# Patient Record
Sex: FEMALE
[De-identification: names, ages, dates, MRNs, and addresses within clinical notes are randomized per-mention and may not be internally consistent; named-entity substitution may affect disease eponyms.]

---

## 2022-04-04 ENCOUNTER — APPOINTMENT (OUTPATIENT)
Dept: PAIN MANAGEMENT | Facility: CLINIC | Age: 72
End: 2022-04-04
Payer: MEDICARE

## 2022-04-04 VITALS — WEIGHT: 117 LBS | BODY MASS INDEX: 21.53 KG/M2 | HEIGHT: 62 IN

## 2022-04-04 PROBLEM — Z00.00 ENCOUNTER FOR PREVENTIVE HEALTH EXAMINATION: Status: ACTIVE | Noted: 2022-04-04

## 2022-04-04 PROCEDURE — 99213 OFFICE O/P EST LOW 20 MIN: CPT

## 2022-04-04 RX ORDER — CELECOXIB 200 MG/1
200 CAPSULE ORAL DAILY
Qty: 30 | Refills: 2 | Status: ACTIVE | COMMUNITY
Start: 2022-04-04 | End: 1900-01-01

## 2022-04-05 NOTE — ASSESSMENT
[FreeTextEntry1] : A thorough discussion occurred regarding available pain management treatment options including interventional, rehabilitative, pharmacological, and alternative modalities with the patient. We will proceed with the following:\par \par Interventional treatment options:\par - none indicated at present time \par - briefly discussed right cervical facet directed intervention with failure of conservative care \par - see additional instructions below\par \par Rehabilitative options:\par - continue physical therapy trial\par - transition to HEP as tolerated \par - see additional instructions below\par \par Medication based treatment options:\par - continue Celebrex 200mg daily PRN \par - see additional instructions below\par \par Complementary treatment options:\par - lifestyle modifications discussed\par - See additional instructions below\par \par Additional treatment recommendations as follows:\par - disposition: see below\par - follow up in 6 weeks or PRN \par \par Komal LABOY acting as scribe, attest that this documentation has been prepared under the direction and in the presence of Provider Kye Hale DO.\par \par The documentation recorded by the scribe, in my presence, accurately reflects the service I personally performed and the decisions made by me with my edits as appropriate.

## 2022-04-05 NOTE — HISTORY OF PRESENT ILLNESS
[Lower back] : lower back [4] : 4 [3] : 3 [Diffuse] : diffuse [Dull/Aching] : dull/aching [Intermittent] : intermittent [Meds] : meds [Physical therapy] : physical therapy [Walking] : walking [Bending forward] : bending forward [Stairs] : stairs [Retired] : Work status: retired [FreeTextEntry1] : 4/04/22 - Patient presents for a FUV. Patient reports she has utilized Celebrex and participated PT with benefit.  Patient reports she attended 6 sessions of PT since the previous visit. Patient reports she is at an acceptable level of pain. \par \par 2/28/22 - Patient presents for initial evaluation. She c/o neck pain into the right shoulder and further into bicep ending above the elbow. Symptoms began in December. Mild numbness and tingling of the hand which she attributes to recent CTR. Patient has tried conservative therapies such as Icy Hot, OTC medications. The neck and arm symptoms occur independently of one another. History of right shoulder surgery and carpal tunnel surgery. History of osteoarthritis. \par \par Previous Injections: No \par \par Pertinent Surgical History: N/A\par \par Imaging:\par MRI Cervical Spine (12/12/2018) - [facility]\par \par C2-3: There is no disc herniation, significant central canal or neural foraminal stenosis.\par C3-4: There is a mild to moderate ventral and bilateral disc/ridge complex with a larger left-sided component causing significant left foraminal compromise and suspected left C4\par nerve root impingement. There is no cord compression or significant central canal stenosis.\par C4-5: There is mild disc bulge and minimal bilateral uncovertebral joint degenerative change. There is no cord compression, significant central canal or foraminal stenosis.\par C5-6: There is mild to moderate ventral and bilateral disc/ridge complex with flattening\par of ventral thecal sac but no cord compression or significant central canal stenosis. There is mild bilateral foraminal encroachment.\par C6-7: There is mild disc bulge impinging upon the thecal sac. There is no cord compression, significant central canal or foraminal stenosis.\par C7-T1: There is a small right paracentral disc herniation impinging upon the thecal sac. There is no significant central canal or foraminal stenosis. The cord is normal in caliber and signal characteristics. The craniocervical junction is normal.\par Physician Disclaimer: I have personally reviewed and confirmed all HPI data with the patient. [] : no

## 2022-12-07 ENCOUNTER — OFFICE (OUTPATIENT)
Dept: URBAN - METROPOLITAN AREA CLINIC 6 | Facility: CLINIC | Age: 72
Setting detail: OPHTHALMOLOGY
End: 2022-12-07
Payer: MEDICARE

## 2022-12-07 DIAGNOSIS — H04.221: ICD-10-CM

## 2022-12-07 DIAGNOSIS — H43.393: ICD-10-CM

## 2022-12-07 DIAGNOSIS — H18.599: ICD-10-CM

## 2022-12-07 DIAGNOSIS — H16.223: ICD-10-CM

## 2022-12-07 DIAGNOSIS — H02.012: ICD-10-CM

## 2022-12-07 DIAGNOSIS — H25.13: ICD-10-CM

## 2022-12-07 PROCEDURE — 92014 COMPRE OPH EXAM EST PT 1/>: CPT | Performed by: OPHTHALMOLOGY

## 2022-12-07 ASSESSMENT — TONOMETRY
OS_IOP_MMHG: 20
OD_IOP_MMHG: 20

## 2022-12-07 ASSESSMENT — KERATOMETRY
OS_AXISANGLE_DEGREES: 005
OD_AXISANGLE_DEGREES: 090
OD_K2POWER_DIOPTERS: 45.50
OD_K1POWER_DIOPTERS: 45.50
OS_K1POWER_DIOPTERS: 45.25
OS_K2POWER_DIOPTERS: 46.00
METHOD_AUTO_MANUAL: AUTO

## 2022-12-07 ASSESSMENT — REFRACTION_CURRENTRX
OD_OVR_VA: 20/
OD_VPRISM_DIRECTION: SV
OS_ADD: +2.50
OS_OVR_VA: 20/
OD_ADD: +2.50
OS_VPRISM_DIRECTION: SV

## 2022-12-07 ASSESSMENT — REFRACTION_MANIFEST
OS_VA1: 20/30-1
OS_SPHERE: +0.25
OS_CYLINDER: -0.75
OD_VA1: 20/30-2
OS_AXIS: 088
OD_CYLINDER: -0.25
OD_AXIS: 104
OD_SPHERE: PLANO

## 2022-12-07 ASSESSMENT — CONFRONTATIONAL VISUAL FIELD TEST (CVF)
OD_FINDINGS: FULL
OS_FINDINGS: FULL

## 2022-12-07 ASSESSMENT — REFRACTION_AUTOREFRACTION
OS_AXIS: 088
OS_SPHERE: +0.25
OS_CYLINDER: -0.75
OD_SPHERE: PLANO
OD_AXIS: 104
OD_CYLINDER: -0.25

## 2022-12-07 ASSESSMENT — AXIALLENGTH_DERIVED
OS_AL: 22.8821
OS_AL: 22.8821

## 2022-12-07 ASSESSMENT — VISUAL ACUITY
OD_BCVA: 20/50-2
OS_BCVA: 20/30-2

## 2022-12-07 ASSESSMENT — LID EXAM ASSESSMENTS: OD_TRICHIASIS: RLL

## 2022-12-07 ASSESSMENT — DECREASING TEAR LAKE - SEVERITY SCORE
OS_DEC_TEARLAKE: 1+
OD_DEC_TEARLAKE: 1+

## 2022-12-07 ASSESSMENT — SPHEQUIV_DERIVED
OS_SPHEQUIV: -0.125
OS_SPHEQUIV: -0.125

## 2022-12-07 ASSESSMENT — CORNEAL DYSTROPHY
OS_DYSTROPHY: SUPERIORLY
OD_DYSTROPHY: SUPERIORLY

## 2023-01-22 ENCOUNTER — OFFICE (OUTPATIENT)
Dept: URBAN - METROPOLITAN AREA CLINIC 100 | Facility: CLINIC | Age: 73
Setting detail: OPHTHALMOLOGY
End: 2023-01-22
Payer: MEDICARE

## 2023-01-22 ENCOUNTER — RX ONLY (RX ONLY)
Age: 73
End: 2023-01-22

## 2023-01-22 DIAGNOSIS — H04.223: ICD-10-CM

## 2023-01-22 DIAGNOSIS — H04.551: ICD-10-CM

## 2023-01-22 PROCEDURE — 68840 EXPLORE/IRRIGATE TEAR DUCTS: CPT | Performed by: OPHTHALMOLOGY

## 2023-01-22 PROCEDURE — 31231 NASAL ENDOSCOPY DX: CPT | Performed by: OPHTHALMOLOGY

## 2023-01-22 ASSESSMENT — KERATOMETRY
OS_K1POWER_DIOPTERS: 45.25
OD_K2POWER_DIOPTERS: 45.50
OD_AXISANGLE_DEGREES: 090
OS_K2POWER_DIOPTERS: 46.00
OS_AXISANGLE_DEGREES: 005
METHOD_AUTO_MANUAL: AUTO

## 2023-01-22 ASSESSMENT — REFRACTION_AUTOREFRACTION
OS_SPHERE: +0.25
OD_SPHERE: PLANO
OS_CYLINDER: -0.75
OD_CYLINDER: -0.25
OD_AXIS: 104
OS_AXIS: 088

## 2023-01-22 ASSESSMENT — VISUAL ACUITY
OS_BCVA: 20/30
OD_BCVA: 20/50

## 2023-01-22 ASSESSMENT — REFRACTION_CURRENTRX
OS_ADD: +2.50
OD_OVR_VA: 20/
OD_ADD: +2.50
OS_VPRISM_DIRECTION: SV
OD_VPRISM_DIRECTION: SV
OS_OVR_VA: 20/

## 2023-01-22 ASSESSMENT — CONFRONTATIONAL VISUAL FIELD TEST (CVF)
OD_FINDINGS: FULL
OS_FINDINGS: FULL

## 2023-01-22 ASSESSMENT — LID EXAM ASSESSMENTS: OD_TRICHIASIS: RLL

## 2023-01-22 ASSESSMENT — AXIALLENGTH_DERIVED: OS_AL: 22.8821

## 2023-01-22 ASSESSMENT — CORNEAL DYSTROPHY
OS_DYSTROPHY: SUPERIORLY
OD_DYSTROPHY: SUPERIORLY

## 2023-01-22 ASSESSMENT — LACRIMAL DUCT - ASSESSMENT: OS_LACRIMAL_DUCT: PASSAGEWAY OPEN UPON IRRIGATION.

## 2023-01-22 ASSESSMENT — SPHEQUIV_DERIVED: OS_SPHEQUIV: -0.125

## 2023-01-22 ASSESSMENT — DECREASING TEAR LAKE - SEVERITY SCORE
OS_DEC_TEARLAKE: 1+
OD_DEC_TEARLAKE: 1+

## 2023-02-20 ENCOUNTER — OFFICE (OUTPATIENT)
Dept: URBAN - METROPOLITAN AREA CLINIC 6 | Facility: CLINIC | Age: 73
Setting detail: OPHTHALMOLOGY
End: 2023-02-20
Payer: MEDICARE

## 2023-02-20 DIAGNOSIS — H16.223: ICD-10-CM

## 2023-02-20 DIAGNOSIS — H04.223: ICD-10-CM

## 2023-02-20 DIAGNOSIS — H04.551: ICD-10-CM

## 2023-02-20 PROCEDURE — 99213 OFFICE O/P EST LOW 20 MIN: CPT | Performed by: OPHTHALMOLOGY

## 2023-02-20 ASSESSMENT — CORNEAL DYSTROPHY
OS_DYSTROPHY: SUPERIORLY
OD_DYSTROPHY: SUPERIORLY

## 2023-02-20 ASSESSMENT — DECREASING TEAR LAKE - SEVERITY SCORE: OS_DEC_TEARLAKE: 1+

## 2023-02-20 ASSESSMENT — CONFRONTATIONAL VISUAL FIELD TEST (CVF)
OS_FINDINGS: FULL
OD_FINDINGS: FULL

## 2023-02-20 ASSESSMENT — INCREASING TEAR LAKE - SEVERITY SCORE: OD_INC_TEARLAKE: 1+

## 2023-02-20 ASSESSMENT — LID EXAM ASSESSMENTS: OD_TRICHIASIS: ABSENT

## 2023-02-21 ASSESSMENT — AXIALLENGTH_DERIVED: OS_AL: 22.8821

## 2023-02-21 ASSESSMENT — KERATOMETRY
OD_K2POWER_DIOPTERS: 45.50
METHOD_AUTO_MANUAL: AUTO
OS_AXISANGLE_DEGREES: 005
OS_K2POWER_DIOPTERS: 46.00
OD_AXISANGLE_DEGREES: 090
OS_K1POWER_DIOPTERS: 45.25

## 2023-02-21 ASSESSMENT — REFRACTION_AUTOREFRACTION
OD_SPHERE: PLANO
OS_SPHERE: +0.25
OD_CYLINDER: -0.25
OD_AXIS: 104
OS_AXIS: 088
OS_CYLINDER: -0.75

## 2023-02-21 ASSESSMENT — VISUAL ACUITY
OD_BCVA: 20/50
OS_BCVA: 20/40-1

## 2023-02-21 ASSESSMENT — SPHEQUIV_DERIVED: OS_SPHEQUIV: -0.125

## 2023-03-09 ENCOUNTER — AMBULATORY SURGERY CENTER (OUTPATIENT)
Dept: URBAN - METROPOLITAN AREA SURGERY 4 | Facility: SURGERY | Age: 73
Setting detail: OPHTHALMOLOGY
End: 2023-03-09
Payer: MEDICARE

## 2023-03-09 DIAGNOSIS — H04.551: ICD-10-CM

## 2023-03-09 DIAGNOSIS — H04.223: ICD-10-CM

## 2023-03-09 PROCEDURE — 31200 REMOVAL OF ETHMOID SINUS: CPT | Performed by: OPHTHALMOLOGY

## 2023-03-09 PROCEDURE — 68720 CREATE TEAR SAC DRAIN: CPT | Performed by: OPHTHALMOLOGY

## 2023-03-09 PROCEDURE — 68815 PROBE NASOLACRIMAL DUCT: CPT | Performed by: OPHTHALMOLOGY

## 2023-03-20 ENCOUNTER — OFFICE (OUTPATIENT)
Dept: URBAN - METROPOLITAN AREA CLINIC 6 | Facility: CLINIC | Age: 73
Setting detail: OPHTHALMOLOGY
End: 2023-03-20
Payer: MEDICARE

## 2023-03-20 DIAGNOSIS — H16.223: ICD-10-CM

## 2023-03-20 DIAGNOSIS — H04.551: ICD-10-CM

## 2023-03-20 PROCEDURE — 99024 POSTOP FOLLOW-UP VISIT: CPT | Performed by: OPHTHALMOLOGY

## 2023-03-20 ASSESSMENT — SPHEQUIV_DERIVED: OS_SPHEQUIV: -0.125

## 2023-03-20 ASSESSMENT — CORNEAL DYSTROPHY
OS_DYSTROPHY: SUPERIORLY
OD_DYSTROPHY: SUPERIORLY

## 2023-03-20 ASSESSMENT — KERATOMETRY
OS_K1POWER_DIOPTERS: 45.25
OS_K2POWER_DIOPTERS: 46.00
OD_K2POWER_DIOPTERS: 45.50
OD_AXISANGLE_DEGREES: 090
OS_AXISANGLE_DEGREES: 005
METHOD_AUTO_MANUAL: AUTO

## 2023-03-20 ASSESSMENT — REFRACTION_AUTOREFRACTION
OS_SPHERE: +0.25
OD_SPHERE: PLANO
OD_CYLINDER: -0.25
OS_CYLINDER: -0.75
OD_AXIS: 104
OS_AXIS: 088

## 2023-03-20 ASSESSMENT — INCREASING TEAR LAKE - SEVERITY SCORE: OD_INC_TEARLAKE: 1+

## 2023-03-20 ASSESSMENT — LID EXAM ASSESSMENTS: OD_TRICHIASIS: ABSENT

## 2023-03-20 ASSESSMENT — AXIALLENGTH_DERIVED: OS_AL: 22.8821

## 2023-03-20 ASSESSMENT — CONFRONTATIONAL VISUAL FIELD TEST (CVF)
OS_FINDINGS: FULL
OD_FINDINGS: FULL

## 2023-03-20 ASSESSMENT — DECREASING TEAR LAKE - SEVERITY SCORE: OS_DEC_TEARLAKE: 1+

## 2023-03-20 ASSESSMENT — VISUAL ACUITY
OD_BCVA: 20/30-2
OS_BCVA: 20/30-1

## 2023-03-20 ASSESSMENT — LACRIMAL DUCT - ASSESSMENT: OD_LACRIMAL_DUCT: WOUND C/D/I HEALING WELL

## 2023-06-19 ENCOUNTER — OFFICE (OUTPATIENT)
Dept: URBAN - METROPOLITAN AREA CLINIC 6 | Facility: CLINIC | Age: 73
Setting detail: OPHTHALMOLOGY
End: 2023-06-19
Payer: MEDICARE

## 2023-06-19 DIAGNOSIS — H04.551: ICD-10-CM

## 2023-06-19 DIAGNOSIS — H16.223: ICD-10-CM

## 2023-06-19 PROCEDURE — 99213 OFFICE O/P EST LOW 20 MIN: CPT | Performed by: OPHTHALMOLOGY

## 2023-06-19 ASSESSMENT — REFRACTION_AUTOREFRACTION
OS_AXIS: 088
OD_SPHERE: PLANO
OD_AXIS: 104
OS_CYLINDER: -0.75
OS_SPHERE: +0.25
OD_CYLINDER: -0.25

## 2023-06-19 ASSESSMENT — LID EXAM ASSESSMENTS: OD_TRICHIASIS: ABSENT

## 2023-06-19 ASSESSMENT — KERATOMETRY
OD_AXISANGLE_DEGREES: 090
OS_K2POWER_DIOPTERS: 46.00
OS_K1POWER_DIOPTERS: 45.25
OD_K2POWER_DIOPTERS: 45.50
OS_AXISANGLE_DEGREES: 005
METHOD_AUTO_MANUAL: AUTO

## 2023-06-19 ASSESSMENT — SPHEQUIV_DERIVED: OS_SPHEQUIV: -0.125

## 2023-06-19 ASSESSMENT — VISUAL ACUITY
OS_BCVA: 20/50
OD_BCVA: 20/30-2

## 2023-06-19 ASSESSMENT — CONFRONTATIONAL VISUAL FIELD TEST (CVF)
OD_FINDINGS: FULL
OS_FINDINGS: FULL

## 2023-06-19 ASSESSMENT — CORNEAL DYSTROPHY
OD_DYSTROPHY: SUPERIORLY
OS_DYSTROPHY: SUPERIORLY

## 2023-06-19 ASSESSMENT — INCREASING TEAR LAKE - SEVERITY SCORE: OD_INC_TEARLAKE: 1+

## 2023-06-19 ASSESSMENT — DECREASING TEAR LAKE - SEVERITY SCORE: OS_DEC_TEARLAKE: 1+

## 2023-06-19 ASSESSMENT — AXIALLENGTH_DERIVED: OS_AL: 22.8821

## 2023-08-23 ENCOUNTER — RX ONLY (RX ONLY)
Age: 73
End: 2023-08-23

## 2023-08-23 ENCOUNTER — OFFICE (OUTPATIENT)
Dept: URBAN - METROPOLITAN AREA CLINIC 104 | Facility: CLINIC | Age: 73
Setting detail: OPHTHALMOLOGY
End: 2023-08-23
Payer: MEDICARE

## 2023-08-23 DIAGNOSIS — H04.551: ICD-10-CM

## 2023-08-23 DIAGNOSIS — H04.223: ICD-10-CM

## 2023-08-23 PROBLEM — H10.41 ALLERGIC CONJUNCTIVITIS: Status: ACTIVE | Noted: 2023-08-23

## 2023-08-23 PROCEDURE — 68840 EXPLORE/IRRIGATE TEAR DUCTS: CPT | Performed by: OPHTHALMOLOGY

## 2023-08-23 PROCEDURE — 99213 OFFICE O/P EST LOW 20 MIN: CPT | Performed by: OPHTHALMOLOGY

## 2023-08-23 ASSESSMENT — CONFRONTATIONAL VISUAL FIELD TEST (CVF)
OS_FINDINGS: FULL
OD_FINDINGS: FULL

## 2023-08-23 ASSESSMENT — KERATOMETRY
OD_K2POWER_DIOPTERS: 45.50
OD_AXISANGLE_DEGREES: 090
METHOD_AUTO_MANUAL: AUTO
OS_AXISANGLE_DEGREES: 005
OS_K1POWER_DIOPTERS: 45.25
OS_K2POWER_DIOPTERS: 46.00

## 2023-08-23 ASSESSMENT — LACRIMAL DUCT - ASSESSMENT: OD_LACRIMAL_DUCT: PASSAGEWAY OPEN UPON IRRIGATION

## 2023-08-23 ASSESSMENT — CORNEAL DYSTROPHY
OD_DYSTROPHY: SUPERIORLY
OS_DYSTROPHY: SUPERIORLY

## 2023-08-23 ASSESSMENT — VISUAL ACUITY
OD_BCVA: 20/40-2
OS_BCVA: 20/40

## 2023-08-23 ASSESSMENT — INCREASING TEAR LAKE - SEVERITY SCORE: OD_INC_TEARLAKE: 1+

## 2023-08-23 ASSESSMENT — REFRACTION_AUTOREFRACTION
OS_SPHERE: +0.25
OD_CYLINDER: -0.25
OS_AXIS: 088
OS_CYLINDER: -0.75
OD_AXIS: 104
OD_SPHERE: PLANO

## 2023-08-23 ASSESSMENT — SPHEQUIV_DERIVED: OS_SPHEQUIV: -0.125

## 2023-08-23 ASSESSMENT — LID EXAM ASSESSMENTS: OD_TRICHIASIS: ABSENT

## 2023-08-23 ASSESSMENT — AXIALLENGTH_DERIVED: OS_AL: 22.8821

## 2023-08-23 ASSESSMENT — DECREASING TEAR LAKE - SEVERITY SCORE: OS_DEC_TEARLAKE: 1+

## 2023-10-16 ENCOUNTER — OFFICE (OUTPATIENT)
Dept: URBAN - METROPOLITAN AREA CLINIC 6 | Facility: CLINIC | Age: 73
Setting detail: OPHTHALMOLOGY
End: 2023-10-16
Payer: MEDICARE

## 2023-10-16 DIAGNOSIS — H02.132: ICD-10-CM

## 2023-10-16 DIAGNOSIS — H04.223: ICD-10-CM

## 2023-10-16 DIAGNOSIS — H02.135: ICD-10-CM

## 2023-10-16 PROBLEM — H10.45 ALLERGIC CONJUNCTIVITIS ; BOTH EYES: Status: ACTIVE | Noted: 2023-10-16

## 2023-10-16 PROCEDURE — 92285 EXTERNAL OCULAR PHOTOGRAPHY: CPT | Performed by: OPHTHALMOLOGY

## 2023-10-16 PROCEDURE — 68840 EXPLORE/IRRIGATE TEAR DUCTS: CPT | Mod: 50 | Performed by: OPHTHALMOLOGY

## 2023-10-16 PROCEDURE — 99214 OFFICE O/P EST MOD 30 MIN: CPT | Mod: 25 | Performed by: OPHTHALMOLOGY

## 2023-10-16 ASSESSMENT — AXIALLENGTH_DERIVED: OS_AL: 22.8821

## 2023-10-16 ASSESSMENT — REFRACTION_AUTOREFRACTION
OS_SPHERE: +0.25
OD_AXIS: 104
OD_CYLINDER: -0.25
OS_CYLINDER: -0.75
OS_AXIS: 088
OD_SPHERE: PLANO

## 2023-10-16 ASSESSMENT — KERATOMETRY
OD_K2POWER_DIOPTERS: 45.50
METHOD_AUTO_MANUAL: AUTO
OD_AXISANGLE_DEGREES: 090
OS_K2POWER_DIOPTERS: 46.00
OS_K1POWER_DIOPTERS: 45.25
OS_AXISANGLE_DEGREES: 005

## 2023-10-16 ASSESSMENT — LID POSITION - ECTROPION
OS_ECTROPION: LLL 2+
OD_ECTROPION: RLL 2+

## 2023-10-16 ASSESSMENT — SPHEQUIV_DERIVED: OS_SPHEQUIV: -0.125

## 2023-10-16 ASSESSMENT — CONFRONTATIONAL VISUAL FIELD TEST (CVF)
OS_FINDINGS: FULL
OD_FINDINGS: FULL

## 2023-10-16 ASSESSMENT — VISUAL ACUITY
OS_BCVA: 20/50+1
OD_BCVA: 20/40-2

## 2023-10-16 ASSESSMENT — INCREASING TEAR LAKE - SEVERITY SCORE: OD_INC_TEARLAKE: 1+

## 2023-10-16 ASSESSMENT — DECREASING TEAR LAKE - SEVERITY SCORE: OS_DEC_TEARLAKE: 1+

## 2023-10-16 ASSESSMENT — LACRIMAL DUCT - ASSESSMENT: OD_LACRIMAL_DUCT: PASSAGEWAY OPEN UPON IRRIGATION

## 2023-10-16 ASSESSMENT — LID EXAM ASSESSMENTS: OD_TRICHIASIS: ABSENT

## 2023-10-16 ASSESSMENT — CORNEAL DYSTROPHY
OS_DYSTROPHY: SUPERIORLY
OD_DYSTROPHY: SUPERIORLY

## 2023-12-08 ENCOUNTER — APPOINTMENT (OUTPATIENT)
Dept: ORTHOPEDIC SURGERY | Facility: CLINIC | Age: 73
End: 2023-12-08

## 2024-02-09 ENCOUNTER — APPOINTMENT (OUTPATIENT)
Dept: ORTHOPEDIC SURGERY | Facility: CLINIC | Age: 74
End: 2024-02-09
Payer: MEDICARE

## 2024-02-09 PROCEDURE — 72040 X-RAY EXAM NECK SPINE 2-3 VW: CPT

## 2024-02-09 PROCEDURE — 73030 X-RAY EXAM OF SHOULDER: CPT | Mod: LT

## 2024-02-09 PROCEDURE — 99204 OFFICE O/P NEW MOD 45 MIN: CPT

## 2024-02-09 RX ORDER — DIAZEPAM 5 MG/1
5 TABLET ORAL 3 TIMES DAILY
Qty: 2 | Refills: 0 | Status: ACTIVE | COMMUNITY
Start: 2024-02-09 | End: 1900-01-01

## 2024-02-09 NOTE — HISTORY OF PRESENT ILLNESS
[de-identified] : This is a 73yo female presenting to the office c/o ongoing bilateral shoulder pain for many months. Patient had right shoulder surgery in 2018. Pt admits the left shoulder started to be painful after physical therapy. Pain is described as constant, severe in quality. Currently pain is 6/10 Patient reports pain has been getting progressively worse. Pain is worse at night. Overall pain does improve with rest and ice. Patient denies any numbness or tingling. Pt is recovering from a concussion.

## 2024-02-09 NOTE — PHYSICAL EXAM
[No spinal deformity, fracture, lytic lesion, or marked single level collapse] : No spinal deformity, fracture, lytic lesion, or marked single level collapse [Left] : left shoulder [Degenerative change] : Degenerative change [de-identified] : Constitutional: The general appearance of the patient is well developed, well nourished, no deformities and well groomed. Normal  Gait: Gait and function is as follows: normal gait.  Skin: Head and neck visualized skin is normal. Left upper extremity visualized skin is normal. Right upper extremity visualized skin is normal. Thoracic Skin of the thoracic spine shows visualized skin is normal.  Cardiovascular: palpable radial pulse bilaterally, good capillary refill in digits of the bilateral upper extremities and no temperature or color changes in the bilateral upper extremities.  Lymphatic: Normal Palpation of lymph nodes in the cervical.  Neurologic: fine motor control in the bilateral upper extremities is intact. Deep Tendon Reflexes in Upper and Lower Extremities Negative Webb's in the bilateral upper extremities. The patient is oriented to time, place and person. Sensation to light touch intact in the bilateral upper extremities. Mood and Affect is normal.  Right Shoulder: Inspection of the shoulder/upper arm is as follows: no scapula winging, no biceps deformity and no AC joint deformity. Palpation of the shoulder/upper arm is as follows: There is tenderness at the proximal biceps tendon. Range of motion of the shoulder is as follows: Pain with internal rotation, external rotation, abduction and forward flexion. Strength of the shoulder is as follows: Supraspinatus 4/5. External Rotation 4/5. Internal Rotation 4/5. Infraspinatus 5/5 4/5. Deltoid 5/5 Ligament Stability and Special Tests of the shoulder is as follows: Neer test is positive. Kelley' test is positive.  Left Shoulder: Inspection of the shoulder/upper arm is as follows: no scapula winging, no biceps deformity and no AC joint deformity. Palpation of the shoulder/upper arm is as follows: There is tenderness at the proximal biceps tendon. Range of motion of the shoulder is as follows: Pain with internal rotation, external rotation, abduction and forward flexion. Strength of the shoulder is as follows: Supraspinatus 4/5. External Rotation 4/5. Internal Rotation 4/5. Infraspinatus 5/5 4/5. Deltoid 5/5 Ligament Stability and Special Tests of the shoulder is as follows: Neer test is positive. Kelley' test is positive.  Neck:  Inspection / Palpation of the cervical spine is as follows: mild paracervical tenderness. Range of motion of the cervical spine is as follows: moderately decreased range of motion of the cervical spine. Stability testing for the cervical spine is as follows Stable range of motion.  Back, including spine: Inspection / Palpation of the thoracic/lumbar spine is as follows: There is a full, pain free, stable range of motion of the thoracic spine with a normal tone and not tenderness to palpation..

## 2024-02-09 NOTE — DISCUSSION/SUMMARY
[de-identified] : This is a 75yo female presenting to the office c/o ongoing bilateral shoulder pain for many months. Cervical xray demonstrates degenerative changes left shoulder xray demonstrates degenerative changes  Left shoulder MRI to further evaluate rotator cuff Follow up 2 weeks Consider subacromial injection and/or formal physical therapy.  (1) We discussed a comprehensive treatment plans that included a prescription management plan involving the use of prescription strength medications to include Ibuprofen 600-800 mg TID, versus 500-650 mg Tylenol. We also discussed prescribing topical diclofenac (Voltaren gel) as well as once daily Meloxicam 15 mg. (2) The patient has More Than One chronic injuries/illnesses as outlined, discussed, and documented by ICD 10 codes listed, as well as the HPI and Plan section. There is a moderate risk of morbidity with further treatment, especially from use of prescription strength medications and possible side effects of these medications which include upset stomach and cardiac/renal issues with long term use were discussed. (3) I recommended that the patient follow-up with their medical physician to discuss any significant specific potential issues with long term use such as interactions with current medications or with exacerbation of underlying medical morbidities.   Attestation: I, Yolanda Shepard , attest that this documentation has been prepared under the direction and in the presence of Provider Khari Ma MD. The documentation recorded by the scribe, in my presence, accurately reflects the service I personally performed, and the decisions made by me with my edits as appropriate. Khari Ma MD

## 2024-02-10 ENCOUNTER — RESULT REVIEW (OUTPATIENT)
Age: 74
End: 2024-02-10

## 2024-02-14 ENCOUNTER — RX ONLY (RX ONLY)
Age: 74
End: 2024-02-14

## 2024-02-14 ENCOUNTER — OFFICE (OUTPATIENT)
Dept: URBAN - METROPOLITAN AREA CLINIC 1 | Facility: CLINIC | Age: 74
Setting detail: OPHTHALMOLOGY
End: 2024-02-14
Payer: MEDICARE

## 2024-02-14 DIAGNOSIS — H43.393: ICD-10-CM

## 2024-02-14 DIAGNOSIS — H18.599: ICD-10-CM

## 2024-02-14 DIAGNOSIS — H16.223: ICD-10-CM

## 2024-02-14 DIAGNOSIS — H25.13: ICD-10-CM

## 2024-02-14 PROCEDURE — 83861 MICROFLUID ANALY TEARS: CPT | Mod: QW,LT | Performed by: OPHTHALMOLOGY

## 2024-02-14 PROCEDURE — 83861 MICROFLUID ANALY TEARS: CPT | Mod: QW,RT | Performed by: OPHTHALMOLOGY

## 2024-02-14 PROCEDURE — 92014 COMPRE OPH EXAM EST PT 1/>: CPT | Performed by: OPHTHALMOLOGY

## 2024-02-14 ASSESSMENT — LID POSITION - ECTROPION
OS_ECTROPION: LLL 2+
OD_ECTROPION: RLL 2+

## 2024-02-14 ASSESSMENT — LACRIMAL DUCT - ASSESSMENT: OD_LACRIMAL_DUCT: PASSAGEWAY OPEN UPON IRRIGATION

## 2024-02-14 ASSESSMENT — REFRACTION_MANIFEST
OD_AXIS: 065
OU_VA: 20/20-2
OD_VA1: 20/30-1
OS_CYLINDER: -0.75
OD_SPHERE: -0.25
OS_SPHERE: -0.25
OS_VA1: 20/30-2
OS_AXIS: 100
OD_CYLINDER: -0.25

## 2024-02-14 ASSESSMENT — INCREASING TEAR LAKE - SEVERITY SCORE: OD_INC_TEARLAKE: 1+

## 2024-02-14 ASSESSMENT — SPHEQUIV_DERIVED
OS_SPHEQUIV: -0.625
OS_SPHEQUIV: -0.625
OD_SPHEQUIV: -0.375
OD_SPHEQUIV: -0.375

## 2024-02-14 ASSESSMENT — CONFRONTATIONAL VISUAL FIELD TEST (CVF)
OD_FINDINGS: FULL
OS_FINDINGS: FULL

## 2024-02-14 ASSESSMENT — REFRACTION_AUTOREFRACTION
OD_AXIS: 064
OS_CYLINDER: -0.75
OS_AXIS: 101
OD_SPHERE: -0.25
OD_CYLINDER: -0.25
OS_SPHERE: -0.25

## 2024-02-14 ASSESSMENT — CORNEAL DYSTROPHY
OD_DYSTROPHY: SUPERIORLY
OS_DYSTROPHY: SUPERIORLY

## 2024-02-14 ASSESSMENT — DECREASING TEAR LAKE - SEVERITY SCORE: OS_DEC_TEARLAKE: 1+

## 2024-02-21 ENCOUNTER — APPOINTMENT (OUTPATIENT)
Dept: ORTHOPEDIC SURGERY | Facility: CLINIC | Age: 74
End: 2024-02-21
Payer: MEDICARE

## 2024-02-21 PROCEDURE — 99214 OFFICE O/P EST MOD 30 MIN: CPT

## 2024-02-21 RX ORDER — METHYLPREDNISOLONE 4 MG/1
4 TABLET ORAL
Qty: 1 | Refills: 0 | Status: ACTIVE | COMMUNITY
Start: 2024-02-21 | End: 1900-01-01

## 2024-02-21 RX ORDER — CELECOXIB 200 MG/1
200 CAPSULE ORAL DAILY
Qty: 30 | Refills: 0 | Status: ACTIVE | COMMUNITY
Start: 2024-02-21 | End: 1900-01-01

## 2024-02-21 NOTE — PHYSICAL EXAM
[No spinal deformity, fracture, lytic lesion, or marked single level collapse] : No spinal deformity, fracture, lytic lesion, or marked single level collapse [Left] : left shoulder [Degenerative change] : Degenerative change [de-identified] : Constitutional: The general appearance of the patient is well developed, well nourished, no deformities and well groomed. Normal  Gait: Gait and function is as follows: normal gait.  Skin: Head and neck visualized skin is normal. Left upper extremity visualized skin is normal. Right upper extremity visualized skin is normal. Thoracic Skin of the thoracic spine shows visualized skin is normal.  Cardiovascular: palpable radial pulse bilaterally, good capillary refill in digits of the bilateral upper extremities and no temperature or color changes in the bilateral upper extremities.  Lymphatic: Normal Palpation of lymph nodes in the cervical.  Neurologic: fine motor control in the bilateral upper extremities is intact. Deep Tendon Reflexes in Upper and Lower Extremities Negative Webb's in the bilateral upper extremities. The patient is oriented to time, place and person. Sensation to light touch intact in the bilateral upper extremities. Mood and Affect is normal.  Right Shoulder: Inspection of the shoulder/upper arm is as follows: no scapula winging, no biceps deformity and no AC joint deformity. Palpation of the shoulder/upper arm is as follows: There is tenderness at the proximal biceps tendon. Range of motion of the shoulder is as follows: Pain with internal rotation, external rotation, abduction and forward flexion. Strength of the shoulder is as follows: Supraspinatus 4/5. External Rotation 4/5. Internal Rotation 4/5. Infraspinatus 5/5 4/5. Deltoid 5/5 Ligament Stability and Special Tests of the shoulder is as follows: Neer test is positive. Kelley' test is positive.  Left Shoulder: Inspection of the shoulder/upper arm is as follows: no scapula winging, no biceps deformity and no AC joint deformity. Palpation of the shoulder/upper arm is as follows: There is tenderness at the proximal biceps tendon. Range of motion of the shoulder is as follows: Pain with internal rotation, external rotation, abduction and forward flexion. Strength of the shoulder is as follows: Supraspinatus 4/5. External Rotation 4/5. Internal Rotation 4/5. Infraspinatus 5/5 4/5. Deltoid 5/5 Ligament Stability and Special Tests of the shoulder is as follows: Neer test is positive. Kelley' test is positive.  Neck:  Inspection / Palpation of the cervical spine is as follows: mild paracervical tenderness. Range of motion of the cervical spine is as follows: moderately decreased range of motion of the cervical spine. Stability testing for the cervical spine is as follows Stable range of motion.  Back, including spine: Inspection / Palpation of the thoracic/lumbar spine is as follows: There is a full, pain free, stable range of motion of the thoracic spine with a normal tone and not tenderness to palpation..

## 2024-02-21 NOTE — HISTORY OF PRESENT ILLNESS
[de-identified] : This is a 75yo female presenting to the office c/o ongoing bilateral shoulder pain for many months. Patient had right shoulder surgery in 2018. Pt admits the left shoulder started to be painful after physical therapy. Pain is described as constant, severe in quality. Currently pain is 6/10 Patient reports pain has been getting progressively worse. Pain is worse at night. Overall pain does improve with rest and ice. Patient denies any numbness or tingling. Pt is recovering from a concussion.   2/21/24: Patient here for bilateral shoulder pain (left worse than right). Pt reports pain has not gotten better since last visit. Pt here for MRI review.

## 2024-02-21 NOTE — DATA REVIEWED
[FreeTextEntry1] : MRI left shoulder [facility] 2/10/24 Tendinosis of multiple rotator cuff tendons.  Tendinosis of intra-articular long head of biceps tendon with short segment ----- Page Break ----- longitudinal split tear.  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  Subacromial subdeltoid bursitis. Acromioclavicular joint degenerative disease.

## 2024-02-21 NOTE — DISCUSSION/SUMMARY
[de-identified] : This is a 75yo female presenting to the office c/o ongoing bilateral shoulder pain for many months. Prior left shoulder xray demonstrates degenerative changes  Left shoulder MRI demonstrates Glenohumeral joint degenerative disease with humeral head chondromalacia Biceps tendon split. Advised pt follow up with Dr. Bryant or Dr. Melendez for further evaluation  Would hold off on cortisone injection until after neck evaluation if pain continues (consider LHBT injection) Follow up in 6-8 weeks   (1) We discussed a comprehensive treatment plans that included a prescription management plan involving the use of prescription strength medications to include Ibuprofen 600-800 mg TID, versus 500-650 mg Tylenol. We also discussed prescribing topical diclofenac (Voltaren gel) as well as once daily Meloxicam 15 mg. (2) The patient has More Than One chronic injuries/illnesses as outlined, discussed, and documented by ICD 10 codes listed, as well as the HPI and Plan section. There is a moderate risk of morbidity with further treatment, especially from use of prescription strength medications and possible side effects of these medications which include upset stomach and cardiac/renal issues with long term use were discussed. (3) I recommended that the patient follow-up with their medical physician to discuss any significant specific potential issues with long term use such as interactions with current medications or with exacerbation of underlying medical morbidities.   Attestation: I, Yolanda MACIAS'Elvira , attest that this documentation has been prepared under the direction and in the presence of Provider Khari Ma MD. The documentation recorded by the scribe, in my presence, accurately reflects the service I personally performed, and the decisions made by me with my edits as appropriate. Khari Ma MD

## 2024-03-13 ENCOUNTER — APPOINTMENT (OUTPATIENT)
Dept: ORTHOPEDIC SURGERY | Facility: CLINIC | Age: 74
End: 2024-03-13
Payer: MEDICARE

## 2024-03-13 VITALS — HEIGHT: 62 IN | WEIGHT: 126 LBS | BODY MASS INDEX: 23.19 KG/M2

## 2024-03-13 DIAGNOSIS — Z87.39 PERSONAL HISTORY OF OTHER DISEASES OF THE MUSCULOSKELETAL SYSTEM AND CONNECTIVE TISSUE: ICD-10-CM

## 2024-03-13 DIAGNOSIS — Z86.39 PERSONAL HISTORY OF OTHER ENDOCRINE, NUTRITIONAL AND METABOLIC DISEASE: ICD-10-CM

## 2024-03-13 DIAGNOSIS — Z78.9 OTHER SPECIFIED HEALTH STATUS: ICD-10-CM

## 2024-03-13 DIAGNOSIS — Z87.19 PERSONAL HISTORY OF OTHER DISEASES OF THE DIGESTIVE SYSTEM: ICD-10-CM

## 2024-03-13 DIAGNOSIS — Z86.59 PERSONAL HISTORY OF OTHER MENTAL AND BEHAVIORAL DISORDERS: ICD-10-CM

## 2024-03-13 DIAGNOSIS — Z86.79 PERSONAL HISTORY OF OTHER DISEASES OF THE CIRCULATORY SYSTEM: ICD-10-CM

## 2024-03-13 PROCEDURE — 99213 OFFICE O/P EST LOW 20 MIN: CPT

## 2024-03-13 PROCEDURE — 99203 OFFICE O/P NEW LOW 30 MIN: CPT

## 2024-03-13 RX ORDER — TRIAMTERENE AND HYDROCHLOROTHIAZIDE 25; 37.5 MG/1; MG/1
37.5-25 TABLET ORAL
Refills: 0 | Status: ACTIVE | COMMUNITY

## 2024-03-13 RX ORDER — LEVOTHYROXINE SODIUM 0.05 MG/1
50 TABLET ORAL
Refills: 0 | Status: ACTIVE | COMMUNITY

## 2024-03-13 RX ORDER — ESCITALOPRAM OXALATE 5 MG/1
5 TABLET, FILM COATED ORAL
Refills: 0 | Status: ACTIVE | COMMUNITY

## 2024-03-13 RX ORDER — PRAVASTATIN SODIUM 40 MG/1
40 TABLET ORAL
Refills: 0 | Status: ACTIVE | COMMUNITY

## 2024-03-13 RX ORDER — PANTOPRAZOLE 40 MG/1
40 TABLET, DELAYED RELEASE ORAL
Refills: 0 | Status: ACTIVE | COMMUNITY

## 2024-03-13 RX ORDER — DENOSUMAB 60 MG/ML
60 INJECTION SUBCUTANEOUS
Refills: 0 | Status: ACTIVE | COMMUNITY

## 2024-03-17 NOTE — PHYSICAL EXAM
[Normal Coordination] : normal coordination [Normal DTR UE/LE] : normal DTR UE/LE  [Normal Sensation] : normal sensation [Normal Mood and Affect] : normal mood and affect [Oriented] : oriented [No Rash] : no rash [Normal Skin] : normal skin [No Ulcers] : no ulcers [No Lesions] : no lesions [No obvious lymphadenopathy in areas examined] : no obvious lymphadenopathy in areas examined [NL (0-180)] : full passive abduction 0-180 degrees [NL (0-70)] : full internal rotation 0-70 degrees [NL (0-90)] : internal rotation at 90 degrees of abduction 0-90 degrees [NL ()] : external rotation at 90 degrees of abduction 50 -110 degrees [Mild] : mild [] : no pain with adduction

## 2024-03-17 NOTE — DISCUSSION/SUMMARY
[de-identified] : 74 year old female presents for initial evaluation of the cervical spine consistent with radiculopathy and left sided C4 stenosis. I recommended the patient obtain a new MRI, patient declines due to claustrophobia. Given patient does not want to proceed with further studies at this time, advised patient we will continue to monitor this over time. Modify activity as discussed.  Follow up on a PRN basis. If pain persists or worsens, return for re-evaluation and possible new studies.  Prior to appointment and during encounter with patient extensive medical records were reviewed including but not limited to, hospital records, out patient records, imaging results, and lab data. During this appointment the patient was examined, diagnoses were discussed and explained in a face to face manner. In addition extensive time was spent reviewing aforementioned diagnostic studies. Counseling including abnormal image results, differential diagnoses, treatment options, risk and benefits, lifestyle changes, current condition, and current medications was performed. Patient's comments, questions, and concerns were address and patient verbalized understanding. Based on this patient's presentation at our office, which is an orthopedic spine surgeon's office, this patient inherently / intrinsically has a risk, however minute, of developing issues such as Cauda equina syndrome, bowel and bladder changes, or progression of motor or neurological deficits such as paralysis which may be permanent.  I, Skylar Fuentes, attest that this documentation has been prepared under the direction and in the presence of provider Griffin Bryant MD.

## 2024-03-17 NOTE — DATA REVIEWED
[FreeTextEntry1] : [facility] X-Ray cervical spine (2-views) taken on 2/9/2024. I independently reviewed and interpreted the images and additionally note: moderate DDD C3-C5 mild DDD C6-C7   [facility] MRI cervical spine report (Date of Study: 12/8/2018) I reviewed the report (images are unavailable)  Report only: Left sided severe foraminal stenosis causing left C4 nerve root compression Mild central stenosis at C5-C6

## 2024-03-17 NOTE — HISTORY OF PRESENT ILLNESS
[Retired] : Work status: retired [de-identified] : 3/13/2024: Patient presents today for initial evaluation of her neck and upper extremity pain. Patient reports neck pain which radiates down the bilateral upper extremities and stops at the elbow. Reports pain started after getting the COVID vaccination about two weeks ago. She notes weakness with above the head motions and notices that she has been dropping items more often recently. She currently has a concussion from November after a piece of molding fell onto her head, which did not result in neck pain. Notes she has tremors since after the accident.  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

## 2024-03-18 ENCOUNTER — APPOINTMENT (OUTPATIENT)
Dept: PAIN MANAGEMENT | Facility: CLINIC | Age: 74
End: 2024-03-18

## 2024-03-20 ENCOUNTER — APPOINTMENT (OUTPATIENT)
Dept: ORTHOPEDIC SURGERY | Facility: CLINIC | Age: 74
End: 2024-03-20
Payer: MEDICARE

## 2024-03-20 PROCEDURE — 99214 OFFICE O/P EST MOD 30 MIN: CPT | Mod: 25

## 2024-03-20 PROCEDURE — 20611 DRAIN/INJ JOINT/BURSA W/US: CPT | Mod: LT

## 2024-03-20 RX ORDER — DIAZEPAM 5 MG/1
5 TABLET ORAL
Qty: 2 | Refills: 0 | Status: ACTIVE | COMMUNITY
Start: 2024-03-20 | End: 1900-01-01

## 2024-03-20 NOTE — DISCUSSION/SUMMARY
[de-identified] : RUE: TTP LHBT, pain with bear hug and belly press +Speeds referred to biceps, + [facility] and 90/90 ER   This is a 73yo female presenting to the office c/o ongoing bilateral shoulder pain for many months. 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 Patient was interested in proceeding with ultrasound-guided biceps injection for diagnostic and therapeutic purposes. Discussed if injection provides minimal relief patient should return to spine specialist to consider MRI of the cervical spine. Follow up as needed   (1) We discussed a comprehensive treatment plans that included a prescription management plan involving the use of prescription strength medications to include Ibuprofen 600-800 mg TID, versus 500-650 mg Tylenol. We also discussed prescribing topical diclofenac (Voltaren gel) as well as once daily Meloxicam 15 mg. (2) The patient has More Than One chronic injuries/illnesses as outlined, discussed, and documented by ICD 10 codes listed, as well as the HPI and Plan section. There is a moderate risk of morbidity with further treatment, especially from use of prescription strength medications and possible side effects of these medications which include upset stomach and cardiac/renal issues with long term use were discussed. (3) I recommended that the patient follow-up with their medical physician to discuss any significant specific potential issues with long term use such as interactions with current medications or with exacerbation of underlying medical morbidities.   Attestation: I, Yolanda Shepard , attest that this documentation has been prepared under the direction and in the presence of Provider Khari Ma MD. The documentation recorded by the scribe, in my presence, accurately reflects the service I personally performed, and the decisions made by me with my edits as appropriate. Khari Ma MD

## 2024-03-20 NOTE — PHYSICAL EXAM

## 2024-03-20 NOTE — HISTORY OF PRESENT ILLNESS
[de-identified] : This is a 73yo female presenting to the office c/o ongoing bilateral shoulder pain for many months. Patient had right shoulder surgery in 2018. Pt admits the left shoulder started to be painful after physical therapy. Pain is described as constant, severe in quality. Currently pain is 6/10 Patient reports pain has been getting progressively worse. Pain is worse at night. Overall pain does improve with rest and ice. Patient denies any numbness or tingling. Pt is recovering from a concussion.   2/21/24: Patient here for bilateral shoulder pain (left worse than right). Pt reports pain has not gotten better since last visit. Pt here for MRI review.  3/20/24: Patient presents today for a follow-up of left shoulder pain.  Continues to report rating pain into her biceps muscle.  Previous Medrol Dosepak alleviated radicular symptoms to bilateral shoulders.

## 2024-04-04 ENCOUNTER — APPOINTMENT (OUTPATIENT)
Dept: MRI IMAGING | Facility: CLINIC | Age: 74
End: 2024-04-04
Payer: MEDICARE

## 2024-04-04 PROCEDURE — 72141 MRI NECK SPINE W/O DYE: CPT | Mod: MH

## 2024-04-17 ENCOUNTER — APPOINTMENT (OUTPATIENT)
Dept: ORTHOPEDIC SURGERY | Facility: CLINIC | Age: 74
End: 2024-04-17
Payer: MEDICARE

## 2024-04-17 VITALS — HEIGHT: 62 IN | BODY MASS INDEX: 23.19 KG/M2 | WEIGHT: 126 LBS

## 2024-04-17 DIAGNOSIS — M47.812 SPONDYLOSIS W/OUT MYELOPATHY OR RADICULOPATHY, CERVICAL REGION: ICD-10-CM

## 2024-04-17 PROCEDURE — 99214 OFFICE O/P EST MOD 30 MIN: CPT

## 2024-04-20 NOTE — DISCUSSION/SUMMARY
[de-identified] : 73 y/o F with cervical spondylosis and cervical foraminal stenosis. Symptoms currently controlled s/p LT shoulder steroid injection. Updated cervical MRI reviewed with pt today. Compared to 2018 MRI there is significant progression of foraminal stenosis. With the shoulder pain relief, its unlikely that tthe cervical MRI findings are particularly symptomatic.  F/U PRN.   Prior to appointment and during encounter with patient extensive medical records were reviewed including but not limited to, hospital records, out patient records, imaging results, and lab data. During this appointment the patient was examined, diagnoses were discussed and explained in a face to face manner. In addition extensive time was spent reviewing aforementioned diagnostic studies. Counseling including abnormal image results, differential diagnoses, treatment options, risk and benefits, lifestyle changes, current condition, and current medications was performed. Patient's comments, questions, and concerns were address and patient verbalized understanding. Based on this patient's presentation at our office, which is an orthopedic spine surgeon's office, this patient inherently / intrinsically has a risk, however minute, of developing issues such as Cauda equina syndrome, bowel and bladder changes, or progression of motor or neurological deficits such as paralysis which may be permanent.   I, Stacie Doshi, attest that this documentation has been prepared under the direction and in the presence of provider Griffin Bryant MD.

## 2024-04-20 NOTE — PHYSICAL EXAM
[Normal Coordination] : normal coordination [Normal DTR UE/LE] : normal DTR UE/LE  [Normal Sensation] : normal sensation [Normal Mood and Affect] : normal mood and affect [Oriented] : oriented [Normal Skin] : normal skin [No Rash] : no rash [No Ulcers] : no ulcers [No Lesions] : no lesions [No obvious lymphadenopathy in areas examined] : no obvious lymphadenopathy in areas examined [NL (0-180)] : full passive abduction 0-180 degrees [NL (0-70)] : full internal rotation 0-70 degrees [NL (0-90)] : internal rotation at 90 degrees of abduction 0-90 degrees [NL ()] : external rotation at 90 degrees of abduction 50 -110 degrees [Mild] : mild [] : no pain with adduction

## 2024-04-20 NOTE — HISTORY OF PRESENT ILLNESS
[0] : 0 [Radiating] : radiating [] : yes [Retired] : Work status: retired [de-identified] : 04/17/2024: Patient presenting today for an MRI results review. She reports improvement in symptoms. Seen by Dr. Ma given injection in LT shoulder which has provided significant pain relief. No pain, numbness, tingling or weakness in the upper extremities b/l. Pt is healing from a concussion.   3/13/2024: Patient presents today for initial evaluation of her neck and upper extremity pain. Patient reports neck pain which radiates down the bilateral upper extremities and stops at the elbow. Reports pain started after getting the COVID vaccination about two weeks ago. She notes weakness with above the head motions and notices that she has been dropping items more often recently. She currently has a concussion from November after a piece of molding fell onto her head, which did not result in neck pain. Notes she has tremors since after the accident.  PSHx: Shoulder surgery in 2018   [FreeTextEntry7] : b/l arms [de-identified] : no treatment

## 2024-04-20 NOTE — DATA REVIEWED
[FreeTextEntry1] : On my interpretation of these images from [facility] on 4/4/24.  I have additionally reviewed the radiologist report. MRI images were reviewed on today's visit.  C2-3: NL C3-4: mod DDD, mod-severe LT FS, mild RT.  C4-5: mod DDD, mild-mod LT FS, severe RT.  C5-6: mod DDD central protrusion, rt sided severe FS.  C6-7: mild DDD, disc bulge no stenosis.  C7-T1:  small RT sided protrusion, no stenosis **Hemangioma in anterior T1 vertebral body. Compared to 2018 MRI there is significant progression of foraminal stenosis of all 3 levels.   [facility] X-Ray cervical spine (2-views) taken on 2/9/2024. I independently reviewed and interpreted the images and additionally note: moderate DDD C3-C5 mild DDD C6-C7   [facility] MRI cervical spine report (Date of Study: 12/8/2018) I reviewed the report (images are unavailable)  Report only: Left sided severe foraminal stenosis causing left C4 nerve root compression Mild central stenosis at C5-C6

## 2024-04-24 ENCOUNTER — APPOINTMENT (OUTPATIENT)
Dept: ORTHOPEDIC SURGERY | Facility: CLINIC | Age: 74
End: 2024-04-24
Payer: MEDICARE

## 2024-04-24 PROCEDURE — 99214 OFFICE O/P EST MOD 30 MIN: CPT

## 2024-04-24 NOTE — PHYSICAL EXAM

## 2024-04-24 NOTE — DISCUSSION/SUMMARY
[de-identified] : RUE: TTP LHBT, pain with bear hug and belly press +Speeds referred to biceps, + Obriens Pain and 90/90 ER   This is a 73yo female presenting to the office c/o ongoing bilateral shoulder pain for many months. 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 Prior left biceps injection has been providing significant relief If pain returns consider repeat injection  Follow up as needed   (1) We discussed a comprehensive treatment plans that included a prescription management plan involving the use of prescription strength medications to include Ibuprofen 600-800 mg TID, versus 500-650 mg Tylenol. We also discussed prescribing topical diclofenac (Voltaren gel) as well as once daily Meloxicam 15 mg. (2) The patient has More Than One chronic injuries/illnesses as outlined, discussed, and documented by ICD 10 codes listed, as well as the HPI and Plan section. There is a moderate risk of morbidity with further treatment, especially from use of prescription strength medications and possible side effects of these medications which include upset stomach and cardiac/renal issues with long term use were discussed. (3) I recommended that the patient follow-up with their medical physician to discuss any significant specific potential issues with long term use such as interactions with current medications or with exacerbation of underlying medical morbidities.   Attestation: I, Yolanda Shepard , attest that this documentation has been prepared under the direction and in the presence of Provider Khari Ma MD. The documentation recorded by the scribe, in my presence, accurately reflects the service I personally performed, and the decisions made by me with my edits as appropriate. Khari Ma MD

## 2024-04-24 NOTE — HISTORY OF PRESENT ILLNESS
[de-identified] : This is a 73yo female presenting to the office c/o ongoing bilateral shoulder pain for many months. Patient had right shoulder surgery in 2018. Pt admits the left shoulder started to be painful after physical therapy. Pain is described as constant, severe in quality. Currently pain is 6/10 Patient reports pain has been getting progressively worse. Pain is worse at night. Overall pain does improve with rest and ice. Patient denies any numbness or tingling. Pt is recovering from a concussion.   2/21/24: Patient here for bilateral shoulder pain (left worse than right). Pt reports pain has not gotten better since last visit. Pt here for MRI review.  3/20/24: Patient presents today for a follow-up of left shoulder pain.  Continues to report rating pain into her biceps muscle.  Previous Medrol Dosepak alleviated radicular symptoms to bilateral shoulders.  4/24/24: Patient here for left shoulder pain. Pt reports the biceps injection to the left shoulder has been providing significant relief.

## 2024-06-12 ENCOUNTER — APPOINTMENT (OUTPATIENT)
Dept: ORTHOPEDIC SURGERY | Facility: CLINIC | Age: 74
End: 2024-06-12
Payer: MEDICARE

## 2024-06-12 DIAGNOSIS — M75.42 IMPINGEMENT SYNDROME OF LEFT SHOULDER: ICD-10-CM

## 2024-06-12 DIAGNOSIS — M75.22 BICIPITAL TENDINITIS, LEFT SHOULDER: ICD-10-CM

## 2024-06-12 DIAGNOSIS — M75.52 BURSITIS OF LEFT SHOULDER: ICD-10-CM

## 2024-06-12 DIAGNOSIS — S43.432A SUPERIOR GLENOID LABRUM LESION OF LEFT SHOULDER, INITIAL ENCOUNTER: ICD-10-CM

## 2024-06-12 PROCEDURE — 99214 OFFICE O/P EST MOD 30 MIN: CPT | Mod: 25,57

## 2024-06-12 PROCEDURE — 20611 DRAIN/INJ JOINT/BURSA W/US: CPT | Mod: LT

## 2024-06-12 NOTE — DISCUSSION/SUMMARY
[de-identified] : RUE: TTP LHBT, pain with bear hug and belly press +Speeds referred to biceps, + Obriens Pain and 90/90 ER   This is a 75yo female presenting to the office c/o ongoing bilateral shoulder pain for many months. 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 Prior left biceps injection provided relief for 3 months up until recently Repeat biceps injection to help facilitate activities of daily living If repeat injection provides transient relief consider one more injection versus surgical intervention  Follow up as needed   (1) We discussed a comprehensive treatment plans that included a prescription management plan involving the use of prescription strength medications to include Ibuprofen 600-800 mg TID, versus 500-650 mg Tylenol. We also discussed prescribing topical diclofenac (Voltaren gel) as well as once daily Meloxicam 15 mg. (2) The patient has More Than One chronic injuries/illnesses as outlined, discussed, and documented by ICD 10 codes listed, as well as the HPI and Plan section. There is a moderate risk of morbidity with further treatment, especially from use of prescription strength medications and possible side effects of these medications which include upset stomach and cardiac/renal issues with long term use were discussed. (3) I recommended that the patient follow-up with their medical physician to discuss any significant specific potential issues with long term use such as interactions with current medications or with exacerbation of underlying medical morbidities.   Attestation: I, Yolanda Shepard , attest that this documentation has been prepared under the direction and in the presence of Provider Khari Ma MD. The documentation recorded by the scribe, in my presence, accurately reflects the service I personally performed, and the decisions made by me with my edits as appropriate. Khari Ma MD

## 2024-06-12 NOTE — PHYSICAL EXAM

## 2024-06-12 NOTE — HISTORY OF PRESENT ILLNESS
[de-identified] : This is a 75yo female presenting to the office c/o ongoing bilateral shoulder pain for many months. Patient had right shoulder surgery in 2018. Pt admits the left shoulder started to be painful after physical therapy. Pain is described as constant, severe in quality. Currently pain is 6/10 Patient reports pain has been getting progressively worse. Pain is worse at night. Overall pain does improve with rest and ice. Patient denies any numbness or tingling. Pt is recovering from a concussion.   2/21/24: Patient here for bilateral shoulder pain (left worse than right). Pt reports pain has not gotten better since last visit. Pt here for MRI review.  3/20/24: Patient presents today for a follow-up of left shoulder pain.  Continues to report rating pain into her biceps muscle.  Previous Medrol Dosepak alleviated radicular symptoms to bilateral shoulders.  4/24/24: Patient here for left shoulder pain. Pt reports the biceps injection to the left shoulder has been providing significant relief.   6/12/24: Patient here for left shoulder pain. Pt admits the biceps injection that was given in March has worn off and pain has returned.

## 2024-08-07 ENCOUNTER — APPOINTMENT (OUTPATIENT)
Dept: ORTHOPEDIC SURGERY | Facility: CLINIC | Age: 74
End: 2024-08-07

## 2024-08-07 PROCEDURE — 99214 OFFICE O/P EST MOD 30 MIN: CPT

## 2024-08-07 NOTE — DISCUSSION/SUMMARY
[de-identified] : RUE: TTP LHBT, pain with bear hug and belly press +Speeds referred to biceps, + Obriens Pain and 90/90 ER   This is a 73yo female presenting to the office c/o ongoing bilateral shoulder pain for many months. 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 Repeat biceps injection provided mild but transient relief  Voltaren gel recommended  Surgical intervention discussed  Pt will come back before her trip in September for a biceps injection  Follow up 3 months   (1) We discussed a comprehensive treatment plans that included a prescription management plan involving the use of prescription strength medications to include Ibuprofen 600-800 mg TID, versus 500-650 mg Tylenol. We also discussed prescribing topical diclofenac (Voltaren gel) as well as once daily Meloxicam 15 mg. (2) The patient has More Than One chronic injuries/illnesses as outlined, discussed, and documented by ICD 10 codes listed, as well as the HPI and Plan section. There is a moderate risk of morbidity with further treatment, especially from use of prescription strength medications and possible side effects of these medications which include upset stomach and cardiac/renal issues with long term use were discussed. (3) I recommended that the patient follow-up with their medical physician to discuss any significant specific potential issues with long term use such as interactions with current medications or with exacerbation of underlying medical morbidities.   Attestation: I, Yolanda Shepard , attest that this documentation has been prepared under the direction and in the presence of Provider Khari Ma MD. The documentation recorded by the scribe, in my presence, accurately reflects the service I personally performed, and the decisions made by me with my edits as appropriate. Khari Ma MD

## 2024-08-07 NOTE — HISTORY OF PRESENT ILLNESS
[de-identified] : This is a 75yo female presenting to the office c/o ongoing bilateral shoulder pain for many months. Patient had right shoulder surgery in 2018. Pt admits the left shoulder started to be painful after physical therapy. Pain is described as constant, severe in quality. Currently pain is 6/10 Patient reports pain has been getting progressively worse. Pain is worse at night. Overall pain does improve with rest and ice. Patient denies any numbness or tingling. Pt is recovering from a concussion.   2/21/24: Patient here for bilateral shoulder pain (left worse than right). Pt reports pain has not gotten better since last visit. Pt here for MRI review.  3/20/24: Patient presents today for a follow-up of left shoulder pain.  Continues to report rating pain into her biceps muscle.  Previous Medrol Dosepak alleviated radicular symptoms to bilateral shoulders.  4/24/24: Patient here for left shoulder pain. Pt reports the biceps injection to the left shoulder has been providing significant relief.   6/12/24: Patient here for left shoulder pain. Pt admits the biceps injection that was given in March has worn off and pain has returned.   8/7/24: Patient here for left shoulder pain. Patient reports mild relief from Celebrex. Repeat biceps injection from last visit provided mild but transient relief.

## 2024-08-07 NOTE — PHYSICAL EXAM

## 2024-12-11 ENCOUNTER — APPOINTMENT (OUTPATIENT)
Dept: ORTHOPEDIC SURGERY | Facility: CLINIC | Age: 74
End: 2024-12-11
Payer: MEDICARE

## 2024-12-11 DIAGNOSIS — M75.52 BURSITIS OF LEFT SHOULDER: ICD-10-CM

## 2024-12-11 DIAGNOSIS — S43.432A SUPERIOR GLENOID LABRUM LESION OF LEFT SHOULDER, INITIAL ENCOUNTER: ICD-10-CM

## 2024-12-11 DIAGNOSIS — M75.22 BICIPITAL TENDINITIS, LEFT SHOULDER: ICD-10-CM

## 2024-12-11 DIAGNOSIS — M47.812 SPONDYLOSIS W/OUT MYELOPATHY OR RADICULOPATHY, CERVICAL REGION: ICD-10-CM

## 2024-12-11 PROCEDURE — 99214 OFFICE O/P EST MOD 30 MIN: CPT

## 2024-12-11 RX ORDER — METHYLPREDNISOLONE 4 MG/1
4 TABLET ORAL
Qty: 1 | Refills: 0 | Status: ACTIVE | COMMUNITY
Start: 2024-12-11 | End: 1900-01-01

## 2025-01-03 ENCOUNTER — APPOINTMENT (OUTPATIENT)
Dept: ORTHOPEDIC SURGERY | Facility: CLINIC | Age: 75
End: 2025-01-03
Payer: MEDICARE

## 2025-01-03 VITALS — HEIGHT: 62 IN | WEIGHT: 126 LBS | BODY MASS INDEX: 23.19 KG/M2

## 2025-01-03 DIAGNOSIS — M54.12 RADICULOPATHY, CERVICAL REGION: ICD-10-CM

## 2025-01-03 DIAGNOSIS — M48.02 SPINAL STENOSIS, CERVICAL REGION: ICD-10-CM

## 2025-01-03 DIAGNOSIS — M47.812 SPONDYLOSIS W/OUT MYELOPATHY OR RADICULOPATHY, CERVICAL REGION: ICD-10-CM

## 2025-01-03 PROCEDURE — 99213 OFFICE O/P EST LOW 20 MIN: CPT

## 2025-02-10 ENCOUNTER — APPOINTMENT (OUTPATIENT)
Dept: PAIN MANAGEMENT | Facility: CLINIC | Age: 75
End: 2025-02-10
Payer: MEDICARE

## 2025-02-10 VITALS — WEIGHT: 123 LBS | BODY MASS INDEX: 22.63 KG/M2 | HEIGHT: 62 IN

## 2025-02-10 DIAGNOSIS — M54.12 RADICULOPATHY, CERVICAL REGION: ICD-10-CM

## 2025-02-10 DIAGNOSIS — M48.02 SPINAL STENOSIS, CERVICAL REGION: ICD-10-CM

## 2025-02-10 PROCEDURE — 99215 OFFICE O/P EST HI 40 MIN: CPT

## 2025-03-07 ENCOUNTER — APPOINTMENT (OUTPATIENT)
Dept: PAIN MANAGEMENT | Facility: CLINIC | Age: 75
End: 2025-03-07
Payer: MEDICARE

## 2025-03-07 DIAGNOSIS — M54.12 RADICULOPATHY, CERVICAL REGION: ICD-10-CM

## 2025-03-07 PROCEDURE — 62321 NJX INTERLAMINAR CRV/THRC: CPT

## 2025-04-02 ENCOUNTER — APPOINTMENT (OUTPATIENT)
Dept: ORTHOPEDIC SURGERY | Facility: CLINIC | Age: 75
End: 2025-04-02

## 2025-04-07 ENCOUNTER — APPOINTMENT (OUTPATIENT)
Dept: PAIN MANAGEMENT | Facility: CLINIC | Age: 75
End: 2025-04-07
Payer: MEDICARE

## 2025-04-07 VITALS — HEIGHT: 62 IN | WEIGHT: 119 LBS | BODY MASS INDEX: 21.9 KG/M2

## 2025-04-07 DIAGNOSIS — M54.12 RADICULOPATHY, CERVICAL REGION: ICD-10-CM

## 2025-04-07 DIAGNOSIS — M47.812 SPONDYLOSIS W/OUT MYELOPATHY OR RADICULOPATHY, CERVICAL REGION: ICD-10-CM

## 2025-04-07 DIAGNOSIS — M48.02 SPINAL STENOSIS, CERVICAL REGION: ICD-10-CM

## 2025-04-07 PROCEDURE — 99214 OFFICE O/P EST MOD 30 MIN: CPT

## 2025-06-26 ENCOUNTER — APPOINTMENT (OUTPATIENT)
Dept: ORTHOPEDIC SURGERY | Facility: CLINIC | Age: 75
End: 2025-06-26
Payer: MEDICARE

## 2025-06-26 PROBLEM — M25.551 RIGHT HIP PAIN: Status: ACTIVE | Noted: 2025-06-26

## 2025-06-26 PROCEDURE — 72100 X-RAY EXAM L-S SPINE 2/3 VWS: CPT

## 2025-06-26 PROCEDURE — 99214 OFFICE O/P EST MOD 30 MIN: CPT

## 2025-06-26 RX ORDER — METHYLPREDNISOLONE 4 MG/1
4 TABLET ORAL
Qty: 1 | Refills: 0 | Status: ACTIVE | COMMUNITY
Start: 2025-06-26 | End: 1900-01-01

## 2025-07-09 ENCOUNTER — APPOINTMENT (OUTPATIENT)
Dept: ORTHOPEDIC SURGERY | Facility: CLINIC | Age: 75
End: 2025-07-09
Payer: MEDICARE

## 2025-07-09 PROBLEM — M54.16 LUMBAR RADICULOPATHY: Status: ACTIVE | Noted: 2025-06-26

## 2025-07-09 PROCEDURE — 99213 OFFICE O/P EST LOW 20 MIN: CPT

## 2025-08-05 ENCOUNTER — APPOINTMENT (OUTPATIENT)
Dept: PAIN MANAGEMENT | Facility: CLINIC | Age: 75
End: 2025-08-05
Payer: COMMERCIAL

## 2025-08-05 ENCOUNTER — RX RENEWAL (OUTPATIENT)
Age: 75
End: 2025-08-05

## 2025-08-05 VITALS — HEIGHT: 62 IN | WEIGHT: 122 LBS | BODY MASS INDEX: 22.45 KG/M2

## 2025-08-05 DIAGNOSIS — M48.02 SPINAL STENOSIS, CERVICAL REGION: ICD-10-CM

## 2025-08-05 DIAGNOSIS — M47.812 SPONDYLOSIS W/OUT MYELOPATHY OR RADICULOPATHY, CERVICAL REGION: ICD-10-CM

## 2025-08-05 DIAGNOSIS — M54.12 RADICULOPATHY, CERVICAL REGION: ICD-10-CM

## 2025-08-05 PROCEDURE — 99204 OFFICE O/P NEW MOD 45 MIN: CPT

## 2025-08-15 ENCOUNTER — APPOINTMENT (OUTPATIENT)
Dept: ORTHOPEDIC SURGERY | Facility: CLINIC | Age: 75
End: 2025-08-15